# Patient Record
Sex: MALE | Race: WHITE
[De-identification: names, ages, dates, MRNs, and addresses within clinical notes are randomized per-mention and may not be internally consistent; named-entity substitution may affect disease eponyms.]

---

## 2018-08-23 ENCOUNTER — HOSPITAL ENCOUNTER (EMERGENCY)
Dept: HOSPITAL 80 - FED | Age: 23
Discharge: HOME | End: 2018-08-23
Payer: COMMERCIAL

## 2018-08-23 VITALS — DIASTOLIC BLOOD PRESSURE: 74 MMHG | SYSTOLIC BLOOD PRESSURE: 141 MMHG

## 2018-08-23 DIAGNOSIS — Z77.21: Primary | ICD-10-CM

## 2018-08-23 PROCEDURE — G0472 HEP C SCREEN HIGH RISK/OTHER: HCPCS

## 2018-08-23 NOTE — EDPHY
H & P


Stated Complaint: exposure


Time Seen by Provider: 08/23/18 23:10


HPI/ROS: 





Chief Complaint:  Blood exposure





HPI:  23-year-old  exposed to blood while arresting a suspect.  

This suspect is known to be HIV positive.  The suspect had had cut his left arm 

with a knife.  The officer was wearing gloves and does not have any open 

wounds.  He also worse glasses.  Does not believe he had any blood splatter in 

his face.  He is currently without complaint.





ROS:  10 point Review of Systems is negative except as noted in the HPI.





PMH:  Denies





Social History: No smoking, no alcohol,  no recreational drug use





Family History: non-contributory





Physical Exam:


Gen: Awake, Alert, No Distress


HEENT:  


     Nose: no rhinorrhea


     Eyes: PERRLA, EOMI


     Mouth: Moist mucosa 


Neck: Supple, no JVD


Chest: nontender, lungs clear to auscultation


Heart: S1, S2 normal, no murmur


Abd: Soft, non-tender, no guarding


Back: no CVA tenderness, no midline tenderness 


Ext: no edema, non-tender


Skin: no rash


Neuro: CN II-XII intact, Sensation grossly intact, Strength 5/5 in bilateral 

upper and lower extremities








- Personal History


Current Tetanus Diphtheria and Acellular Pertussis (TDAP): Yes





- Medical/Surgical History


Hx Asthma: No


Hx Chronic Respiratory Disease: No


Hx Diabetes: No


Hx Cardiac Disease: No


Hx Renal Disease: No


Hx Cirrhosis: No


Hx Alcoholism: No


Hx HIV/AIDS: No


Hx Splenectomy or Spleen Trauma: No


Other PMH: appendectomy





- Social History


Smoking Status: Never smoked


Constitutional: 


 Initial Vital Signs











Temperature (C)  37 C   08/23/18 23:01


 


Heart Rate  87   08/23/18 23:01


 


Respiratory Rate  16   08/23/18 23:01


 


Blood Pressure  141/74 H  08/23/18 23:01


 


O2 Sat (%)  99   08/23/18 23:01








 











O2 Delivery Mode               Room Air














Allergies/Adverse Reactions: 


 





No Known Allergies Allergy (Unverified 08/23/18 23:00)


 








Home Medications: 














 Medication  Instructions  Recorded


 


NK [No Known Home Meds]  08/23/18














Medical Decision Making


ED Course/Re-evaluation: 





23-year-old male status post blood exposure.  He does not have any open wounds 

or any mucous membranes exposure so I think this is very low risk.  We have 

discussed the risks and benefits of HIV prophylaxis.  At this time he is 

declining.  He has had the initial blood test done.  He will follow up with the 

work comp physicians.





- Data Points


Laboratory Results: 


 











  08/23/18





  23:15


 


Hep Bs Antibody  POSITIVE 





   (NEGATIVE) 


 


Hepatitis C Antibody  NEGATIVE 





   (NEGATIVE) 


 


HIV 1&2 Antibody  NEGATIVE 





   (NEGATIVE) 














Departure





- Departure


Disposition: Home, Routine, Self-Care


Clinical Impression: 


 Patient exposure to body fluids





Condition: Good


Instructions:  Normal Exam (ED)


Additional Instructions: 


Follow up with the workman's Comp doctor for any concerns.


Referrals: 


NONE *PRIMARY CARE P,. [Primary Care Provider] - As per Instructions

## 2018-08-24 LAB
HEPATITIS C ANTIBODY TOTAL: NEGATIVE
HIV TYPE 1 AND 2: NEGATIVE